# Patient Record
Sex: MALE
[De-identification: names, ages, dates, MRNs, and addresses within clinical notes are randomized per-mention and may not be internally consistent; named-entity substitution may affect disease eponyms.]

---

## 2019-11-06 ENCOUNTER — HEALTH MAINTENANCE LETTER (OUTPATIENT)
Age: 34
End: 2019-11-06

## 2020-11-29 ENCOUNTER — HEALTH MAINTENANCE LETTER (OUTPATIENT)
Age: 35
End: 2020-11-29

## 2021-09-19 ENCOUNTER — HEALTH MAINTENANCE LETTER (OUTPATIENT)
Age: 36
End: 2021-09-19

## 2022-01-09 ENCOUNTER — HEALTH MAINTENANCE LETTER (OUTPATIENT)
Age: 37
End: 2022-01-09

## 2022-11-21 ENCOUNTER — HEALTH MAINTENANCE LETTER (OUTPATIENT)
Age: 37
End: 2022-11-21

## 2023-04-16 ENCOUNTER — HEALTH MAINTENANCE LETTER (OUTPATIENT)
Age: 38
End: 2023-04-16

## 2023-06-12 ENCOUNTER — PRE VISIT (OUTPATIENT)
Dept: ONCOLOGY | Facility: CLINIC | Age: 38
End: 2023-06-12
Payer: COMMERCIAL

## 2023-06-12 ENCOUNTER — TRANSCRIBE ORDERS (OUTPATIENT)
Dept: OTHER | Age: 38
End: 2023-06-12

## 2023-06-12 ENCOUNTER — PATIENT OUTREACH (OUTPATIENT)
Dept: ONCOLOGY | Facility: CLINIC | Age: 38
End: 2023-06-12
Payer: COMMERCIAL

## 2023-06-12 DIAGNOSIS — Z15.01 BRCA2 POSITIVE: Primary | ICD-10-CM

## 2023-06-12 DIAGNOSIS — Z15.09 BRCA2 POSITIVE: Primary | ICD-10-CM

## 2023-06-12 NOTE — PROGRESS NOTES
Writer received referral to Cancer Risk Management/Genetic Counseling.    Referred for: Patient is previous patient of Jenna Evans, APRN-CNS, OCN, ANG-BC, for BRCA2 positive    Per past note 2015:  He requires screening and surveillance to minimize his risk of cancer secondary to having a deleterious BRCA2 mutation, specifically 6174delT.  He is considered to be at higher risk for male breast cancer, prostate cancer, and melanoma.    Referral reviewed for appropriate plan, and sent to New Patient Scheduling for completion.    Fartun Tavares, RN, BSN  Oncology New Patient Nurse Navigator   [facility]  [phone]

## 2023-06-12 NOTE — TELEPHONE ENCOUNTER
BRCA2 Positive    Action June 12, 2023 3:04 PM TJ   Incoming call/Self Referral Called transferred from New PT scheduling.  PT seen here prior to 2015 when he relocated.  Since then he has the following information:    -[facility] [phone]   -[facility] 2017- Present  -No imaging since leaving [facility] the first time  -Genetic testing originally done either here or [facility]

## 2023-06-27 NOTE — TELEPHONE ENCOUNTER
RECORDS STATUS - ALL OTHER DIAGNOSIS      RECORDS RECEIVED FROM: [facility]   DATE RECEIVED:    NOTES STATUS DETAILS   OFFICE NOTE from referring provider External 6/12/23: Self Referred   OFFICE NOTE from medical oncologist [facility] 10/1/15: Lucrecia Olopade  4/18/14: Jenna Evans, CNS   OFFICE NOTE from genetic counselor [facility] 2/15/18: Dr. Milton Betancourt  4/18/14: Roro Mike GC   OPERATIVE REPORT [facility] 5/9/22: Skin Biopsy   MEDICATION LIST [facility]    LABS     PATHOLOGY REPORTS [facility] 5/9/22: [id number]   ANYTHING RELATED TO DIAGNOSIS CE- [facility] Most recent from 6/1/23   GENONOMIC TESTING     TYPE: External: [facility] 1/15/15: [id number]

## 2023-06-28 ENCOUNTER — PRE VISIT (OUTPATIENT)
Dept: OTHER | Age: 38
End: 2023-06-28

## 2023-06-30 ENCOUNTER — ONCOLOGY VISIT (OUTPATIENT)
Dept: ONCOLOGY | Facility: CLINIC | Age: 38
End: 2023-06-30
Attending: CLINICAL NURSE SPECIALIST
Payer: COMMERCIAL

## 2023-06-30 VITALS
DIASTOLIC BLOOD PRESSURE: 66 MMHG | RESPIRATION RATE: 16 BRPM | BODY MASS INDEX: 23.97 KG/M2 | TEMPERATURE: 98.8 F | HEIGHT: 74 IN | OXYGEN SATURATION: 97 % | WEIGHT: 186.8 LBS | HEART RATE: 75 BPM | SYSTOLIC BLOOD PRESSURE: 107 MMHG

## 2023-06-30 DIAGNOSIS — Z12.5 SCREENING FOR PROSTATE CANCER: ICD-10-CM

## 2023-06-30 DIAGNOSIS — Z15.09 BRCA2 GENETIC CARRIER: Primary | ICD-10-CM

## 2023-06-30 DIAGNOSIS — Z15.01 BRCA2 GENETIC CARRIER: Primary | ICD-10-CM

## 2023-06-30 PROBLEM — L85.8 KP (KERATOSIS PILARIS): Status: ACTIVE | Noted: 2019-01-21

## 2023-06-30 PROBLEM — I73.00 RAYNAUD'S PHENOMENON WITHOUT GANGRENE: Status: ACTIVE | Noted: 2023-06-01

## 2023-06-30 PROBLEM — Z15.03 BRCA GENE MUTATION POSITIVE IN MALE: Status: ACTIVE | Noted: 2023-06-01

## 2023-06-30 PROBLEM — H52.222 REGULAR ASTIGMATISM OF LEFT EYE: Status: ACTIVE | Noted: 2017-08-17

## 2023-06-30 PROBLEM — K64.8 HEMORRHOIDS, INTERNAL: Status: ACTIVE | Noted: 2023-06-01

## 2023-06-30 LAB — PSA SERPL DL<=0.01 NG/ML-MCNC: 1.49 NG/ML (ref 0–2.5)

## 2023-06-30 PROCEDURE — 36415 COLL VENOUS BLD VENIPUNCTURE: CPT | Performed by: CLINICAL NURSE SPECIALIST

## 2023-06-30 PROCEDURE — 84153 ASSAY OF PSA TOTAL: CPT | Performed by: CLINICAL NURSE SPECIALIST

## 2023-06-30 PROCEDURE — 99204 OFFICE O/P NEW MOD 45 MIN: CPT | Performed by: CLINICAL NURSE SPECIALIST

## 2023-06-30 PROCEDURE — G0463 HOSPITAL OUTPT CLINIC VISIT: HCPCS | Performed by: CLINICAL NURSE SPECIALIST

## 2023-06-30 RX ORDER — HYDROCORTISONE 2.5 %
CREAM (GRAM) TOPICAL
COMMUNITY
Start: 2023-06-05

## 2023-06-30 RX ORDER — TACROLIMUS 1 MG/G
OINTMENT TOPICAL
COMMUNITY
Start: 2023-06-05

## 2023-06-30 RX ORDER — BETAMETHASONE VALERATE 1.2 MG/G
CREAM TOPICAL
COMMUNITY
Start: 2023-06-05

## 2023-06-30 ASSESSMENT — PAIN SCALES - GENERAL: PAINLEVEL: NO PAIN (0)

## 2023-06-30 NOTE — LETTER
2023         RE: Max Rose  [address]  [facility] [address]        Dear Colleague,    Thank you for referring your patient, Max Rose, to the [facility] [place] [facility]. Please see a copy of my visit note below.    Oncology Risk Management Consultation:  Date on this visit: 2023    Max Rose returns to the clinic today for an oncology risk management consultation. He requires specialized screening to reduce his risk of cancer secondary to a deleterious BRCA2 mutation and is considered to be at high risk for hereditary male breast and prostate cancer. 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    Primary Physician: Delio Wiseman MD    History Of Present Illness:  Mr. Rose is a very pleasant, healthy  38 year old male who presents with family history of breast and prostate cancer and a personal diagnosis of a BRCA2 mutation.    Genetic Testin2014- Positive for the 6174delT BRCA2 gene, identified using a Multisite 3 BRACAnalysis through [employer] at [facility].      Screening History:    Pancreas:  6/10/2015- CT of abdomen and pelvis (Dr. Mark Diane): IMPRESSION:   1. No evidence of pancreatic cancer.  2. 7 cm simple cyst in the upper pole of the left kidney.  3. Calcifications in the prostate.     Prostate:  2016- 0.6, WNL    Skin:  2014- Dermatology screening: FINAL DIAGNOSIS:  Skin, back, right upper: - Nevus pigmentosum, compound, with inflammation  Reports annual dermatology screenings.    Review of Systems:  GENERAL: No change in weight, sleep or appetite.  Normal energy.  No fever or chills  EYES: Negative for vision changes or eye problems  ENT: No problems with ears, nose or throat.  No difficulty swallowing.  RESP: No coughing, wheezing or shortness of breath  CV: No chest pains or palpitations  GI: No nausea, vomiting,  heartburn, abdominal pain, diarrhea, constipation  or change in bowel habits  : Notes that it is taking him a bit longer to start his urine stream than it used to. No urinary frequency or dysuria, bladder or kidney problems  MUSCULOSKELETAL: No significant muscle or joint pains  NEUROLOGIC: No headaches, numbness, tingling, weakness, problems with balance or coordination  PSYCHIATRIC: No problems with anxiety, depression or mental health  HEME/IMMUNE/ALLERGY: No history of bleeding or clotting problems or anemia.  No allergies or immune system problems  ENDOCRINE: No history of thyroid disease, diabetes or other endocrine disorders  SKIN: No rashes,worrisome lesions or skin problems    Past Medical History:   Diagnosis Date     BRCA2 positive 2014    6174delTmutation     No past surgical history on file.    Allergies:  Allergies as of 2023     (No Known Allergies)       Current Medications:  Current Outpatient Medications   Medication Sig Dispense Refill     betamethasone valerate (VALISONE) 0.1 % external cream        hydrocortisone 2.5 % cream        tacrolimus (PROTOPIC) 0.1 % external ointment           Family History:  Family History   Problem Relation Age of Onset     Hypothyroidism Mother      Hypertension Mother      Hereditary Breast and Ovarian Cancer Syndrome Father         obligate carrier     Lymphoma Father          at 69     Parkinsonism Father      Hereditary Breast and Ovarian Cancer Syndrome Sister      Restless Leg Syndrome Brother      Sleep Apnea Brother      Esophageal Cancer Maternal Grandmother 78        hx of smoking     Bladder Cancer Maternal Grandfather         hx of smoking;  at 88     Myocardial Infarction Maternal Grandfather      Breast Cancer Paternal Grandmother      Pancreatic Cancer Paternal Grandmother          at 64     Emphysema Paternal Grandfather      Myocardial Infarction Paternal Grandfather      Breast Cancer Paternal Aunt 50        bilateral breast cancer     Hereditary Breast and Ovarian Cancer  "Syndrome Paternal Aunt         BRCA2+     Colon Cancer Maternal Great-Grandmother         over the age of 50     Colon Cancer Other 40        paternal great uncle;  in 40       Social History:  Social History     Socioeconomic History     Marital status: Single     Spouse name: Not on file     Number of children: Not on file     Years of education: Not on file     Highest education level: Not on file   Occupational History     Occupation: Physician     Employer: UNKNOWN   Tobacco Use     Smoking status: Never     Smokeless tobacco: Never   Substance and Sexual Activity     Alcohol use: Not Currently     Drug use: Not on file     Sexual activity: Not on file   Other Topics Concern     Parent/sibling w/ CABG, MI or angioplasty before 65F 55M? Not Asked   Social History Narrative     Not on file     Social Determinants of Health     Financial Resource Strain: Not on file   Food Insecurity: Not on file   Transportation Needs: Not on file   Physical Activity: Not on file   Stress: Not on file   Social Connections: Not on file   Intimate Partner Violence: Not on file   Housing Stability: Not on file       Physical Exam:  /66   Pulse 75   Temp 98.8  F (37.1  C) (Oral)   Resp 16   Ht 1.891 m (6' 2.45\")   Wt 84.7 kg (186 lb 12.8 oz)   SpO2 97%   BMI 23.70 kg/m    GENERAL: Healthy, alert, oriented     NECK: no adenopathy, no asymmetry or masses     LYMPHATICS: No cervical, supraclavicular, axillary or inguinal lymphadenopathy     RESP: lungs clear to auscultation - no rales, rhonchi or wheezes     BREAST TISSUE:: A multipositional, bilateral breast exam was performed. Symmetrical nipples, pectoralis muscles.  Right chest: no palpable dominant masses, no nipple discharge, no skin changes.  Right axilla: no palpable adenopathy. Left chest: no palpable dominant masses, no nipple discharge, no skin changes. Left axilla: no palpable adenopathy.  CARDIOVASCULAR: regular rate and rhythm, normal S1 S2, no S3 or S4 " and no murmur.        SKIN: multiple small raised red and pink bumps on feet and ankles bilaterally. Skin torn on right ankle, but no suspicious lesions or rashes    Laboratory/Imaging Studies  No results found for any visits on 06/30/23.    ASSESSMENT  Max is doing well, happy to be back in [place] and enjoying being around his nieces and nephews here.  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 He has no immediate concerns today and is here to be updated on screening recommendations. We also reviewed the [employer]'s web site with regard to clinical trials in which he could enroll. (See: [employer]).    We discussed the differences between cancer risk for the general population and those with BRCA mutations and reviewed and updated his family history.   Current literature shows that men with BRCA 2 mutations have a 6%  lifetime risk of breast cancer, compared to the general population risk of 1%. Those with a BRCA2 mutation also bear an approximate 35%  lifetime risk of prostate cancer, compared to the 15% lifetime risk within the general population (Charlene et al 2003, Verna et al 201, Tyron et al 2007, Miguelito et al 2010 and NCCN Clinical Practice Guidelines 2015). There is also an increased risk of pancreatic cancer and melanoma. It is expected that 1 percent of all breast cancers in the [place] occur in men, with an estimated 2,240 new cases expected per year.    We discussed signs and symptoms to be watchful for (lumps, hard know or thickening in the breast (usually painless but may be tender), dimpling, puckering or redness of the skin of the breast, itchy, scaly sore or rash on the nipple, nipple discharge and pulling in of the nipple or other parts of the breast.) Today, we francis his blood for his PSA test..     Individualized Surveillance Plan  BRCA1 and BRCA2 Management for Men   Per NCCN Guidelines Version 1.2023   Test or procedure  Last done Next Scheduled      Male breast  cancer, starting at age 35    Risk is 0.2%-1.2% for BRCA1+  Risk is 1.8%-7.1% for BRCA2+ Breast self exam training and education   Complete     Continual     Male breast cancer, starting at age 35 Clinical breast exam, annually   6/30/2023 June 2024     Consider in men with gynomastia starting at 50 or 10 years prior to the earliest male breast cancer in the family Consider annual mammogram     NA   No male breast cancer in family   Prostate cancer screening recommended for BRCA2 carriers starting at age 40.    Risk is 19-61% for BRCA2+    Consider prostate cancer screening for BRCA1 carriers, starting at age 40.    Risk is 7-26% for BRCA1+   Annual PSA test   6/30/2023 June 2024   Pancreatic screening starting at age 50 or 10 years prior to the youngest age of pancreatic cancer in the family, for patients with at least one relative from the BRCA-side of the family with pancreatic cancer   Alternate Magnetic Resonance Cholangiopancreatography and Endoscopic Ultrasound every 1-2 years.  Baseline CT completed; consider routine MRCP screening at 50       Consider full body skin and eye exam for melanoma screening.     I spent a total of 56 minutes on the day of the visit. Please see the note for further information on patient assessment and treatment.    ISABELL Reyes-CNS, OCN, AGN-BC  Clinical Nurse Specialist  Cancer Risk Management Program  [facility]; Delio Wiseman MD

## 2023-06-30 NOTE — PATIENT INSTRUCTIONS
Individualized Surveillance Plan  BRCA1 and BRCA2 Management for Men   Per NCCN Guidelines Version 1.2023   Test or procedure  Last done Next Scheduled      Male breast cancer, starting at age 35    Risk is 0.2%-1.2% for BRCA1+  Risk is 1.8%-7.1% for BRCA2+ Breast self exam training and education   Complete     Continual     Male breast cancer, starting at age 35 Clinical breast exam, annually   6/30/2023 June 2024     Consider in men with gynomastia starting at 50 or 10 years prior to the earliest male breast cancer in the family Consider annual mammogram     NA   No male breast cancer in family   Prostate cancer screening recommended for BRCA2 carriers starting at age 40.    Risk is 19-61% for BRCA2+    Consider prostate cancer screening for BRCA1 carriers, starting at age 40.    Risk is 7-26% for BRCA1+   Annual PSA test     6/30/2023 June 2024   Pancreatic screening starting at age 50 or 10 years prior to the youngest age of pancreatic cancer in the family, for patients with at least one relative from the BRCA-side of the family with pancreatic cancer   Alternate Magnetic Resonance Cholangiopancreatography and Endoscopic Ultrasound every 1-2 years.  Baseline CT completed; consider routine MRCP screening at 50       Consider full body skin and eye exam for melanoma screening.

## 2023-06-30 NOTE — NURSING NOTE
"Oncology Rooming Note    June 30, 2023 9:55 AM   Max Rose is a 38 year old male who presents for:    Chief Complaint   Patient presents with     Oncology Clinic Visit     BRCA2 genetic carrier      Initial Vitals: /66   Pulse 75   Temp 98.8  F (37.1  C) (Oral)   Resp 16   Ht 1.891 m (6' 2.45\")   Wt 84.7 kg (186 lb 12.8 oz)   SpO2 97%   BMI 23.70 kg/m   Estimated body mass index is 23.7 kg/m  as calculated from the following:    Height as of this encounter: 1.891 m (6' 2.45\").    Weight as of this encounter: 84.7 kg (186 lb 12.8 oz). Body surface area is 2.11 meters squared.  No Pain (0) Comment: Data Unavailable   No LMP for male patient.  Allergies reviewed: Yes  Medications reviewed: Yes    Medications: Medication refills not needed today.  Pharmacy name entered into [employer]: [id number] [place], [place] - [facility] [address]    Clinical concerns: No new clinical concerns other than reason for visit today.    Katherine Simon, EMT    "

## 2023-06-30 NOTE — NURSING NOTE
After I verified name and date of birth. I drew labs via venipuncture on this pt while in clinic. They tolerated this well.   Labs sent down to the first floor labs.    Stephany Crenshaw, A

## 2023-06-30 NOTE — PROGRESS NOTES
Oncology Risk Management Consultation:  Date on this visit: 2023    Max Rose returns to the clinic today for an oncology risk management consultation. He requires specialized screening to reduce his risk of cancer secondary to a deleterious BRCA2 mutation and is considered to be at high risk for hereditary male breast and prostate cancer. 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    Primary Physician: Delio Wiseman MD    History Of Present Illness:  Mr. Rose is a very pleasant, healthy  38 year old male who presents with family history of breast and prostate cancer and a personal diagnosis of a BRCA2 mutation.    Genetic Testin2014- Positive for the 6174delT BRCA2 gene, identified using a Multisite 3 BRACAnalysis through [email or web address] at [facility].      Screening History:    Pancreas:  6/10/2015- CT of abdomen and pelvis (Dr. Mark Diane): IMPRESSION:   1. No evidence of pancreatic cancer.  2. 7 cm simple cyst in the upper pole of the left kidney.  3. Calcifications in the prostate.     Prostate:  2016- 0.6, WNL    Skin:  2014- Dermatology screening: FINAL DIAGNOSIS:  Skin, back, right upper: - Nevus pigmentosum, compound, with inflammation  Reports annual dermatology screenings.    Review of Systems:  GENERAL: No change in weight, sleep or appetite.  Normal energy.  No fever or chills  EYES: Negative for vision changes or eye problems  ENT: No problems with ears, nose or throat.  No difficulty swallowing.  RESP: No coughing, wheezing or shortness of breath  CV: No chest pains or palpitations  GI: No nausea, vomiting,  heartburn, abdominal pain, diarrhea, constipation or change in bowel habits  : Notes that it is taking him a bit longer to start his urine stream than it used to. No urinary frequency or dysuria, bladder or kidney problems  MUSCULOSKELETAL: No significant muscle or joint pains  NEUROLOGIC: No headaches,  numbness, tingling, weakness, problems with balance or coordination  PSYCHIATRIC: No problems with anxiety, depression or mental health  HEME/IMMUNE/ALLERGY: No history of bleeding or clotting problems or anemia.  No allergies or immune system problems  ENDOCRINE: No history of thyroid disease, diabetes or other endocrine disorders  SKIN: No rashes,worrisome lesions or skin problems    Past Medical History:   Diagnosis Date     BRCA2 positive 2014    6174delTmutation     No past surgical history on file.    Allergies:  Allergies as of 2023     (No Known Allergies)       Current Medications:  Current Outpatient Medications   Medication Sig Dispense Refill     betamethasone valerate (VALISONE) 0.1 % external cream        hydrocortisone 2.5 % cream        tacrolimus (PROTOPIC) 0.1 % external ointment           Family History:  Family History   Problem Relation Age of Onset     Hypothyroidism Mother      Hypertension Mother      Hereditary Breast and Ovarian Cancer Syndrome Father         obligate carrier     Lymphoma Father          at 69     Parkinsonism Father      Hereditary Breast and Ovarian Cancer Syndrome Sister      Restless Leg Syndrome Brother      Sleep Apnea Brother      Esophageal Cancer Maternal Grandmother 78        hx of smoking     Bladder Cancer Maternal Grandfather         hx of smoking;  at 88     Myocardial Infarction Maternal Grandfather      Breast Cancer Paternal Grandmother      Pancreatic Cancer Paternal Grandmother          at 64     Emphysema Paternal Grandfather      Myocardial Infarction Paternal Grandfather      Breast Cancer Paternal Aunt 50        bilateral breast cancer     Hereditary Breast and Ovarian Cancer Syndrome Paternal Aunt         BRCA2+     Colon Cancer Maternal Great-Grandmother         over the age of 50     Colon Cancer Other 40        paternal great uncle;  in 40       Social History:  Social History     Socioeconomic History     Marital  "status: Single     Spouse name: Not on file     Number of children: Not on file     Years of education: Not on file     Highest education level: Not on file   Occupational History     Occupation: Physician     Employer: UNKNOWN   Tobacco Use     Smoking status: Never     Smokeless tobacco: Never   Substance and Sexual Activity     Alcohol use: Not Currently     Drug use: Not on file     Sexual activity: Not on file   Other Topics Concern     Parent/sibling w/ CABG, MI or angioplasty before 65F 55M? Not Asked   Social History Narrative     Not on file     Social Determinants of Health     Financial Resource Strain: Not on file   Food Insecurity: Not on file   Transportation Needs: Not on file   Physical Activity: Not on file   Stress: Not on file   Social Connections: Not on file   Intimate Partner Violence: Not on file   Housing Stability: Not on file       Physical Exam:  /66   Pulse 75   Temp 98.8  F (37.1  C) (Oral)   Resp 16   Ht 1.891 m (6' 2.45\")   Wt 84.7 kg (186 lb 12.8 oz)   SpO2 97%   BMI 23.70 kg/m    GENERAL: Healthy, alert, oriented     NECK: no adenopathy, no asymmetry or masses     LYMPHATICS: No cervical, supraclavicular, axillary or inguinal lymphadenopathy     RESP: lungs clear to auscultation - no rales, rhonchi or wheezes     BREAST TISSUE:: A multipositional, bilateral breast exam was performed. Symmetrical nipples, pectoralis muscles.  Right chest: no palpable dominant masses, no nipple discharge, no skin changes.  Right axilla: no palpable adenopathy. Left chest: no palpable dominant masses, no nipple discharge, no skin changes. Left axilla: no palpable adenopathy.  CARDIOVASCULAR: regular rate and rhythm, normal S1 S2, no S3 or S4 and no murmur.        SKIN: multiple small raised red and pink bumps on feet and ankles bilaterally. Skin torn on right ankle, but no suspicious lesions or rashes    Laboratory/Imaging Studies  No results found for any visits on " 06/30/23.    ASSESSMENT  Max is doing well, happy to be back in [place] and enjoying being around his nieces and nephews here.  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 He has no immediate concerns today and is here to be updated on screening recommendations. We also reviewed the [employer]'s web site with regard to clinical trials in which he could enroll. (See: [employer]).    We discussed the differences between cancer risk for the general population and those with BRCA mutations and reviewed and updated his family history.   Current literature shows that men with BRCA 2 mutations have a 6%  lifetime risk of breast cancer, compared to the general population risk of 1%. Those with a BRCA2 mutation also bear an approximate 35%  lifetime risk of prostate cancer, compared to the 15% lifetime risk within the general population (Charlene et al 2003, Verna et al 201, Tyron et al 2007, Miguelito et al 2010 and NCCN Clinical Practice Guidelines 2015). There is also an increased risk of pancreatic cancer and melanoma. It is expected that 1 percent of all breast cancers in the [place] occur in men, with an estimated 2,240 new cases expected per year.    We discussed signs and symptoms to be watchful for (lumps, hard know or thickening in the breast (usually painless but may be tender), dimpling, puckering or redness of the skin of the breast, itchy, scaly sore or rash on the nipple, nipple discharge and pulling in of the nipple or other parts of the breast.) Today, we francis his blood for his PSA test..     Individualized Surveillance Plan  BRCA1 and BRCA2 Management for Men   Per NCCN Guidelines Version 1.2023   Test or procedure  Last done Next Scheduled      Male breast cancer, starting at age 35    Risk is 0.2%-1.2% for BRCA1+  Risk is 1.8%-7.1% for BRCA2+ Breast self exam training and education   Complete     Continual     Male breast cancer, starting at age 35 Clinical breast exam, annually    6/30/2023 June 2024     Consider in men with gynomastia starting at 50 or 10 years prior to the earliest male breast cancer in the family Consider annual mammogram     NA   No male breast cancer in family   Prostate cancer screening recommended for BRCA2 carriers starting at age 40.    Risk is 19-61% for BRCA2+    Consider prostate cancer screening for BRCA1 carriers, starting at age 40.    Risk is 7-26% for BRCA1+   Annual PSA test   6/30/2023 June 2024   Pancreatic screening starting at age 50 or 10 years prior to the youngest age of pancreatic cancer in the family, for patients with at least one relative from the BRCA-side of the family with pancreatic cancer   Alternate Magnetic Resonance Cholangiopancreatography and Endoscopic Ultrasound every 1-2 years.  Baseline CT completed; consider routine MRCP screening at 50       Consider full body skin and eye exam for melanoma screening.     I spent a total of 56 minutes on the day of the visit. Please see the note for further information on patient assessment and treatment.    ISABELL Reyes-CNS, OCN, AGN-BC  Clinical Nurse Specialist  Cancer Risk Management Program  [facility]; Delio Wiseman MD

## 2024-06-10 NOTE — PROGRESS NOTES
Oncology Risk Management Consultation:  Date on this visit: 2024    Max Rose returns to the clinic today for an oncology risk management consultation. He requires specialized screening to reduce his risk of cancer secondary to a deleterious BRCA2 mutation and is considered to be at high risk for hereditary male breast and prostate cancer.      Primary Physician: Delio Wiseman MD     History Of Present Illness:  Mr. Rose is a very pleasant, healthy 39 year old male who presents with family history of breast and prostate cancer and a personal diagnosis of a BRCA2 mutation.     Genetic Testin2014- Positive for the 6174delT BRCA2 gene, [place] using a Multisite 3 BRACAnalysis [place] [employer] [place] [facility] [place]       [place] History:     Pancreas:  6/10/2015- CT of abdomen and pelvis (Dr. Mark Diane): IMPRESSION:   1. No evidence of pancreatic cancer.  2. 7 cm simple cyst in the upper pole of the left kidney.  3. Calcifications in the prostate.      Prostate:  2016- 0.6, WNL  2023: 1.49  2024: 0.59     Skin:  2014- Dermatology screening: FINAL DIAGNOSIS:  Skin, back, right upper: - Nevus pigmentosum, compound, with inflammation    Reports annual dermatology and optometry screenings.      Past Medical/Surgical History:  Past Medical History:   Diagnosis Date    BRCA2 positive 2014    6174delTmutation     History reviewed. No pertinent surgical history.    Allergies:  Allergies as of 2024 - Reviewed 2024   Allergen Reaction Noted    Trazodone Other (See Comments) 2023       Current Medications:  Current Outpatient Medications   Medication Sig Dispense Refill    betamethasone valerate (VALISONE) 0.1 % external cream       dextroamphetamine (DEXEDRINE SPANSULE) 10 MG 24 hr capsule Take 20 mg by mouth      fluticasone (FLONASE) 50 MCG/ACT nasal spray 2 sprays      hydrocortisone 2.5 % cream       QUEtiapine (SEROQUEL) 50 MG tablet Take   mg by mouth      tacrolimus (PROTOPIC) 0.1 % external ointment           Family History:  Family History   Problem Relation Age of Onset    Hypothyroidism Mother     Hypertension Mother     Hereditary Breast and Ovarian Cancer Syndrome Father         obligate carrier    Lymphoma Father          at 69    Parkinsonism Father     Hereditary Breast and Ovarian Cancer Syndrome Sister     Restless Leg Syndrome Brother     Sleep Apnea Brother     Esophageal Cancer Maternal Grandmother 78        hx of smoking    Bladder Cancer Maternal Grandfather         hx of smoking;  at 88    Myocardial Infarction Maternal Grandfather     Breast Cancer Paternal Grandmother     Pancreatic Cancer Paternal Grandmother          at 64    Emphysema Paternal Grandfather     Myocardial Infarction Paternal Grandfather     Breast Cancer Paternal Aunt 50        bilateral breast cancer    Hereditary Breast and Ovarian Cancer Syndrome Paternal Aunt         BRCA2+    Colon Cancer Maternal Great-Grandmother         over the age of 50    Colon Cancer Other 40        paternal great uncle;  in 40       Social History:  Social History     Socioeconomic History    Marital status: Single     Spouse name: Not on file    Number of children: Not on file    Years of education: Not on file    Highest education level: Not on file   Occupational History    Occupation: Physician     Employer: UNKNOWN   Tobacco Use    Smoking status: Never    Smokeless tobacco: Never   Substance and Sexual Activity    Alcohol use: Not Currently    Drug use: Not on file    Sexual activity: Not on file   Other Topics Concern    Parent/sibling w/ CABG, MI or angioplasty before 65F 55M? Not Asked   Social History Narrative    Not on file     Social Determinants of Health     Financial Resource Strain: Not on file   Food Insecurity: Not on file   Transportation Needs: Not on file   Physical Activity: Not on file   Stress: Not on file   Social Connections: Not on  file   Interpersonal Safety: Not on file   Housing Stability: Not on file       Physical Exam:  /69   Pulse 69   Temp 97.8  F (36.6  C)   Resp 16   Wt 88.5 kg (195 lb)   SpO2 100%   BMI 24.74 kg/m    GENERAL APPEARANCE: healthy, alert and no apparent distress, except for the concern of her risk of cancer   LYMPHATICS: No cervical, supraclavicular, or axillary lymphadenopathy  CHEST WALL: Examined in a sitting and lying position. Symmetrical without visible distortion, swelling, lumps or rashes. Axillary area without masses or lymphadenopathy.   SKIN: no suspicious lesions or rashes on examined skin    Laboratory/Imaging Studies  Results for orders placed or performed in visit on 06/12/24   PSA tumor marker     Status: Normal   Result Value Ref Range    PSA Tumor Marker 0.59 0.00 - 2.50 ng/mL    Narrative    This result is obtained using the Roche Elecsys total PSA method on the sarah e411 immunoassay analyzer. Results obtained with different assay methods or kits cannot be used interchangeably.       ASSESSMENT    Cesar reports that he is doing well at this visit. He did recently breast his right wrist playing [employer], which is healing well. He has no concerns with his health today, and no updates to his family's medical history. He reports that he recently participated in the [employer] trial through [employer], and, again, tested positive for the same, known, BRCA2 mutation.     We discussed signs and symptoms to be watchful for between visits including breast lumps, thickening, swelling, tenderness, nipple discharge or changes in skin of breasts. We will proceed with the plan below. I will see him back in one year.       INDIVIDUALIZED CANCER RISK MANAGEMENT PLAN:    Individualized Surveillance Plan  BRCA1 and BRCA2 Management for Men   Per NCCN Guidelines Version 1.2024   Test or procedure  Last done Next Scheduled      Male breast cancer, starting at age 35    Risk is 0.2%-1.2% for BRCA1+  Risk  is 1.8%-7.1% for BRCA2+ Breast self exam training and education   June 2024 June 2025     Male breast cancer, starting at age 35 Clinical breast exam, annually   June 2024 June 2025     Consider in men with gynemastia starting at 50 or 10 years prior to the earliest male breast cancer in the family Consider annual mammogram     NA, no male breast cancer in the family   Review at future visits   Prostate cancer screening recommended for BRCA2 carriers starting at age 40.    Risk is 19-61% for BRCA2+    Consider prostate cancer screening for BRCA1 carriers, starting at age 40.    Risk is 7-26% for BRCA1+   Annual PSA test   6/30/2023: 1.49    Scheduled for later today    Repeat next June   Pancreatic screening starting at age 50 or 10 years prior to the youngest age of pancreatic cancer in the family, for patients with at least one relative from the BRCA-side of the family with pancreatic cancer   Alternate Magnetic Resonance Cholangiopancreatography and Endoscopic Ultrasound every 1-2 years.   Paternal grandmother with pancreatic cancer   Consider screening at age 50     Consider full body skin and eye exam for melanoma screening.         I spent a total of 32 minutes on the day of the visit. Please see the note for further information on patient assessment and treatment.     Tracy Garnica, TERESA, APRN, AGCNS-BC  Clinical Nurse Specialist  Cancer Risk Management Program  [facility]    Cc:  Delio Wiseman MD

## 2024-06-12 ENCOUNTER — LAB (OUTPATIENT)
Dept: LAB | Facility: CLINIC | Age: 39
End: 2024-06-12
Payer: COMMERCIAL

## 2024-06-12 ENCOUNTER — ONCOLOGY VISIT (OUTPATIENT)
Dept: ONCOLOGY | Facility: CLINIC | Age: 39
End: 2024-06-12
Attending: CLINICAL NURSE SPECIALIST
Payer: COMMERCIAL

## 2024-06-12 VITALS
TEMPERATURE: 97.8 F | RESPIRATION RATE: 16 BRPM | OXYGEN SATURATION: 100 % | WEIGHT: 195 LBS | BODY MASS INDEX: 24.74 KG/M2 | DIASTOLIC BLOOD PRESSURE: 69 MMHG | SYSTOLIC BLOOD PRESSURE: 112 MMHG | HEART RATE: 69 BPM

## 2024-06-12 DIAGNOSIS — Z15.09 BRCA2 GENETIC CARRIER: ICD-10-CM

## 2024-06-12 DIAGNOSIS — Z15.01 BRCA2 GENETIC CARRIER: ICD-10-CM

## 2024-06-12 DIAGNOSIS — Z12.5 SCREENING FOR PROSTATE CANCER: ICD-10-CM

## 2024-06-12 DIAGNOSIS — Z15.01 BRCA2 GENETIC CARRIER: Primary | ICD-10-CM

## 2024-06-12 DIAGNOSIS — Z15.09 BRCA2 GENETIC CARRIER: Primary | ICD-10-CM

## 2024-06-12 LAB — PSA SERPL DL<=0.01 NG/ML-MCNC: 0.59 NG/ML (ref 0–2.5)

## 2024-06-12 PROCEDURE — 84153 ASSAY OF PSA TOTAL: CPT

## 2024-06-12 PROCEDURE — 99213 OFFICE O/P EST LOW 20 MIN: CPT

## 2024-06-12 PROCEDURE — 36415 COLL VENOUS BLD VENIPUNCTURE: CPT

## 2024-06-12 PROCEDURE — 99212 OFFICE O/P EST SF 10 MIN: CPT

## 2024-06-12 RX ORDER — DEXTROAMPHETAMINE SULFATE 10 MG/1
20 CAPSULE, EXTENDED RELEASE ORAL
COMMUNITY
Start: 2024-06-07

## 2024-06-12 RX ORDER — QUETIAPINE FUMARATE 50 MG/1
50-200 TABLET, FILM COATED ORAL
COMMUNITY
Start: 2024-03-15

## 2024-06-12 RX ORDER — FLUTICASONE PROPIONATE 50 MCG
2 SPRAY, SUSPENSION (ML) NASAL
COMMUNITY
Start: 2024-05-17

## 2024-06-12 ASSESSMENT — PAIN SCALES - GENERAL: PAINLEVEL: NO PAIN (0)

## 2024-06-12 NOTE — PATIENT INSTRUCTIONS
Individualized Surveillance Plan  BRCA1 and BRCA2 Management for Men   Per NCCN Guidelines Version 1.2024   Test or procedure  Last done Next Scheduled      Male breast cancer, starting at age 35    Risk is 0.2%-1.2% for BRCA1+  Risk is 1.8%-7.1% for BRCA2+ Breast self exam training and education   June 2024 June 2025     Male breast cancer, starting at age 35 Clinical breast exam, annually   June 2024 June 2025     Consider in men with gynemastia starting at 50 or 10 years prior to the earliest male breast cancer in the family Consider annual mammogram     NA, no male breast cancer in the family   Review at future visits   Prostate cancer screening recommended for BRCA2 carriers starting at age 40.    Risk is 19-61% for BRCA2+    Consider prostate cancer screening for BRCA1 carriers, starting at age 40.    Risk is 7-26% for BRCA1+   Annual PSA test   6/30/2023: 1.49    Scheduled for later today    Repeat next June   Pancreatic screening starting at age 50 or 10 years prior to the youngest age of pancreatic cancer in the family, for patients with at least one relative from the BRCA-side of the family with pancreatic cancer   Alternate Magnetic Resonance Cholangiopancreatography and Endoscopic Ultrasound every 1-2 years.   Paternal grandmother with pancreatic cancer   Consider screening at age 50       Consider full body skin and eye exam for melanoma screening.

## 2024-06-12 NOTE — LETTER
2024      Max Rose  [address]  [facility] [address]      Dear Colleague,    Thank you for referring your patient, Max Rose, to the [facility]. Please see a copy of my visit note below.    Oncology Risk Management Consultation:  Date on this visit: 2024    Max Rose returns to the clinic today for an oncology risk management consultation. He requires specialized screening to reduce his risk of cancer secondary to a deleterious BRCA2 mutation and is considered to be at high risk for hereditary male breast and prostate cancer.      Primary Physician: Delio Wiseman MD     History Of Present Illness:  Mr. Rose is a very pleasant, healthy 39 year old male who presents with family history of breast and prostate cancer and a personal diagnosis of a BRCA2 mutation.     Genetic Testin2014- Positive for the 6174delT BRCA2 gene, identified using a Multisite 3 BRACAnalysis through [employer] at [facility] [place].       Screening History:     Pancreas:  6/10/2015- CT of abdomen and pelvis (Dr. Mark Diane): IMPRESSION:   1. No evidence of pancreatic cancer.  2. 7 cm simple cyst in the upper pole of the left kidney.  3. Calcifications in the prostate.      Prostate:  2016- 0.6, WNL  2023: 1.49  2024: 0.59     Skin:  2014- Dermatology screening: FINAL DIAGNOSIS:  Skin, back, right upper: - Nevus pigmentosum, compound, with inflammation    Reports annual dermatology and optometry screenings.      Past Medical/Surgical History:  Past Medical History:   Diagnosis Date     BRCA2 positive 2014    6174delTmutation     History reviewed. No pertinent surgical history.    Allergies:  Allergies as of 2024 - Reviewed 2024   Allergen Reaction Noted     Trazodone Other (See Comments) 2023       Current Medications:  Current Outpatient Medications   Medication Sig Dispense Refill     betamethasone valerate (VALISONE)  0.1 % external cream        dextroamphetamine (DEXEDRINE SPANSULE) 10 MG 24 hr capsule Take 20 mg by mouth       fluticasone (FLONASE) 50 MCG/ACT nasal spray 2 sprays       hydrocortisone 2.5 % cream        QUEtiapine (SEROQUEL) 50 MG tablet Take  mg by mouth       tacrolimus (PROTOPIC) 0.1 % external ointment           Family History:  Family History   Problem Relation Age of Onset     Hypothyroidism Mother      Hypertension Mother      Hereditary Breast and Ovarian Cancer Syndrome Father         obligate carrier     Lymphoma Father          at 69     Parkinsonism Father      Hereditary Breast and Ovarian Cancer Syndrome Sister      Restless Leg Syndrome Brother      Sleep Apnea Brother      Esophageal Cancer Maternal Grandmother 78        hx of smoking     Bladder Cancer Maternal Grandfather         hx of smoking;  at 88     Myocardial Infarction Maternal Grandfather      Breast Cancer Paternal Grandmother      Pancreatic Cancer Paternal Grandmother          at 64     Emphysema Paternal Grandfather      Myocardial Infarction Paternal Grandfather      Breast Cancer Paternal Aunt 50        bilateral breast cancer     Hereditary Breast and Ovarian Cancer Syndrome Paternal Aunt         BRCA2+     Colon Cancer Maternal Great-Grandmother         over the age of 50     Colon Cancer Other 40        paternal great uncle;  in 40       Social History:  Social History     Socioeconomic History     Marital status: Single     Spouse name: Not on file     Number of children: Not on file     Years of education: Not on file     Highest education level: Not on file   Occupational History     Occupation: Physician     Employer: UNKNOWN   Tobacco Use     Smoking status: Never     Smokeless tobacco: Never   Substance and Sexual Activity     Alcohol use: Not Currently     Drug use: Not on file     Sexual activity: Not on file   Other Topics Concern     Parent/sibling w/ CABG, MI or angioplasty before 65F 55M? Not  Asked   Social History Narrative     Not on file     Social Determinants of Health     Financial Resource Strain: Not on file   Food Insecurity: Not on file   Transportation Needs: Not on file   Physical Activity: Not on file   Stress: Not on file   Social Connections: Not on file   Interpersonal Safety: Not on file   Housing Stability: Not on file       Physical Exam:  /69   Pulse 69   Temp 97.8  F (36.6  C)   Resp 16   Wt 88.5 kg (195 lb)   SpO2 100%   BMI 24.74 kg/m    GENERAL APPEARANCE: healthy, alert and no apparent distress, except for the concern of her risk of cancer   LYMPHATICS: No cervical, supraclavicular, or axillary lymphadenopathy  CHEST WALL: Examined in a sitting and lying position. Symmetrical without visible distortion, swelling, lumps or rashes. Axillary area without masses or lymphadenopathy.   SKIN: no suspicious lesions or rashes on examined skin    Laboratory/Imaging Studies  Results for orders placed or performed in visit on 06/12/24   PSA tumor marker     Status: Normal   Result Value Ref Range    PSA Tumor Marker 0.59 0.00 - 2.50 ng/mL    Narrative    This result is obtained using the Roche Elecsys total PSA method on the sarah e411 immunoassay analyzer. Results obtained with different assay methods or kits cannot be used interchangeably.       ASSESSMENT    Cesar reports that he is doing well at this visit. He did recently breast his right wrist playing [employer], which is healing well. He has no concerns with his health today, and no updates to his family's medical history. He reports that he recently participated in the [employer] trial through [employer], and, again, tested positive for the same, known, BRCA2 mutation.     We discussed signs and symptoms to be watchful for between visits including breast lumps, thickening, swelling, tenderness, nipple discharge or changes in skin of breasts. We will proceed with the plan below. I will see him back in one year.        INDIVIDUALIZED CANCER RISK MANAGEMENT PLAN:    Individualized Surveillance Plan  BRCA1 and BRCA2 Management for Men   Per NCCN Guidelines Version 1.2024   Test or procedure  Last done Next Scheduled      Male breast cancer, starting at age 35    Risk is 0.2%-1.2% for BRCA1+  Risk is 1.8%-7.1% for BRCA2+ Breast self exam training and education   June 2024 June 2025     Male breast cancer, starting at age 35 Clinical breast exam, annually   June 2024 June 2025     Consider in men with gynemastia starting at 50 or 10 years prior to the earliest male breast cancer in the family Consider annual mammogram     NA, no male breast cancer in the family   Review at future visits   Prostate cancer screening recommended for BRCA2 carriers starting at age 40.    Risk is 19-61% for BRCA2+    Consider prostate cancer screening for BRCA1 carriers, starting at age 40.    Risk is 7-26% for BRCA1+   Annual PSA test   6/30/2023: 1.49    Scheduled for later today    Repeat next June   Pancreatic screening starting at age 50 or 10 years prior to the youngest age of pancreatic cancer in the family, for patients with at least one relative from the BRCA-side of the family with pancreatic cancer   Alternate Magnetic Resonance Cholangiopancreatography and Endoscopic Ultrasound every 1-2 years.   Paternal grandmother with pancreatic cancer   Consider screening at age 50     Consider full body skin and eye exam for melanoma screening.         I spent a total of 32 minutes on the day of the visit. Please see the note for further information on patient assessment and treatment.     Tracy Garnica, DNP, APRN, AGCNS-BC  Clinical Nurse Specialist  Cancer Risk Management Program  [facility]    Cc:  Delio Wiseman MD

## 2024-06-12 NOTE — NURSING NOTE
"Oncology Rooming Note    June 12, 2024 4:07 PM   Max Rose is a 39 year old male who presents for:    Chief Complaint   Patient presents with    Oncology Clinic Visit     BRCA2 genetic cancer      Initial Vitals: /69   Pulse 69   Temp 97.8  F (36.6  C)   Resp 16   Wt 88.5 kg (195 lb)   SpO2 100%   BMI 24.74 kg/m   Estimated body mass index is 24.74 kg/m  as calculated from the following:    Height as of 6/30/23: 1.891 m (6' 2.45\").    Weight as of this encounter: 88.5 kg (195 lb). Body surface area is 2.16 meters squared.  No Pain (0) Comment: Data Unavailable   No LMP for male patient.  Allergies reviewed: Yes  Medications reviewed: Yes    Medications: Medication refills not needed today.  Pharmacy name entered into [facility]: [id number] - [place], [place] - [address]    Frailty Screening:   Is the patient here for a new oncology consult visit in cancer care? 2. No      Clinical concerns: no other complaints      Shamir Boyce"

## 2024-06-12 NOTE — NURSING NOTE
Chief Complaint   Patient presents with    Lab Only     Labs drawn with  by rn.     Labs drawn with  by rn.  Pt tolerated well.     Marilu Conklin RN

## 2024-06-23 ENCOUNTER — HEALTH MAINTENANCE LETTER (OUTPATIENT)
Age: 39
End: 2024-06-23

## 2025-06-06 NOTE — PROGRESS NOTES
Oncology Risk Management Consultation:  Date on this visit: 2025    Max Rose returns to the clinic today for an oncology risk management consultation. He requires specialized screening to reduce his risk of cancer secondary to a deleterious BRCA2 mutation and is considered to be at high risk for hereditary male breast and prostate cancer.      Primary Physician: Delio Wiseman MD     History Of Present Illness:  Mr. Rose is a very pleasant, healthy 40 year old male who presents with family history of breast and prostate cancer and a personal diagnosis of a BRCA2 mutation.     Genetic Testin2014- Positive for the 6174delT BRCA2 gene, identified using a Multisite 3 BRACAnalysis through [employer] at [facility].       Screening History:     Pancreas:  6/10/2015- CT of abdomen and pelvis (Dr. Mark Diane): IMPRESSION:   1. No evidence of pancreatic cancer.  2. 7 cm simple cyst in the upper pole of the left kidney.  3. Calcifications in the prostate.      Prostate:  2016- 0.6, WNL  2023: 1.49  2024: 0.59    Reports annual dermatology screenings and occasional optometry screenings.    Past Medical/Surgical History:  Past Medical History:   Diagnosis Date    BRCA2 positive 2014    6174delTmutation     No past surgical history on file.    Allergies:  Allergies as of 2025 - Reviewed 2025   Allergen Reaction Noted    Trazodone Other (See Comments) 2023       Current Medications:  Current Outpatient Medications   Medication Sig Dispense Refill    betamethasone valerate (VALISONE) 0.1 % external cream       dextroamphetamine (DEXEDRINE SPANSULE) 10 MG 24 hr capsule Take 20 mg by mouth      hydrocortisone 2.5 % cream       QUEtiapine (SEROQUEL) 50 MG tablet Take  mg by mouth      tacrolimus (PROTOPIC) 0.1 % external ointment       fluticasone (FLONASE) 50 MCG/ACT nasal spray 2 sprays (Patient not taking: Reported on 2025)          Family  History:  Family History   Problem Relation Age of Onset    Hypothyroidism Mother     Hypertension Mother     Hereditary Breast and Ovarian Cancer Syndrome Father         obligate carrier    Lymphoma Father          at 69    Parkinsonism Father     Hereditary Breast and Ovarian Cancer Syndrome Sister     Restless Leg Syndrome Brother     Sleep Apnea Brother     Esophageal Cancer Maternal Grandmother 78        hx of smoking    Bladder Cancer Maternal Grandfather         hx of smoking;  at 88    Myocardial Infarction Maternal Grandfather     Breast Cancer Paternal Grandmother     Pancreatic Cancer Paternal Grandmother          at 64    Emphysema Paternal Grandfather     Myocardial Infarction Paternal Grandfather     Breast Cancer Paternal Aunt 50        bilateral breast cancer    Hereditary Breast and Ovarian Cancer Syndrome Paternal Aunt         BRCA2+    Colon Cancer Maternal Great-Grandmother         over the age of 50    Colon Cancer Other 40        paternal great uncle;  in 40       Social History:  Social History     Socioeconomic History    Marital status: Single     Spouse name: Not on file    Number of children: Not on file    Years of education: Not on file    Highest education level: Not on file   Occupational History    Occupation: Physician     Employer: UNKNOWN   Tobacco Use    Smoking status: Never    Smokeless tobacco: Never   Substance and Sexual Activity    Alcohol use: Not Currently    Drug use: Not on file    Sexual activity: Not on file   Other Topics Concern    Parent/sibling w/ CABG, MI or angioplasty before 65F 55M? Not Asked   Social History Narrative    Not on file     Social Drivers of Health     Financial Resource Strain: Not on file   Food Insecurity: No Food Insecurity (2025)    [facility]    Hunger Vital Sign     Worried About Running Out of Food in the Last Year: Never true     Ran Out of Food in the Last Year: Never true   Transportation Needs: No  Transportation Needs (5/5/2025)    Received from [facility]    PRAPARE - Transportation     Lack of Transportation (Medical): No     Lack of Transportation (Non-Medical): No   Physical Activity: Not on file   Stress: Not on file   Social Connections: Not on file   Interpersonal Safety: Not on file   Housing Stability: Low Risk  (5/5/2025)    Received from [employer]    Housing Stability Vital Sign     Unable to Pay for Housing in the Last Year: No     Number of Times Moved in the Last Year: 0     Homeless in the Last Year: No       Physical Exam:  /72   Pulse 75   Temp 97.7  F (36.5  C) (Oral)   Resp 16   Wt 88.9 kg (196 lb)   SpO2 98%   BMI 24.86 kg/m    GENERAL APPEARANCE: healthy, alert and no apparent distress  CHEST WALL: Examined in a sitting and lying position. Symmetrical without visible distortion, swelling, lumps or rashes. No palpable lumps or masses. Axillary area without masses or lymphadenopathy.   LYMPHATICS: No cervical, supraclavicular, or axillary lymphadenopathy  SKIN: no suspicious lesions or rashes on examined skin    ASSESSMENT    Max reports that he is doing well at this visit. He has no updates to his family's medical history. He has been having urologic symptoms and is seeing urology in August. We discussed the screening plan below. He is having a PSA drawn after this visit. We will continue these annually. We will begin pancreatic cancer screening at age 50, and consider an annual mammogram at that time as well.     His chest wall exam was unremarkable. We reviewed signs and symptoms to monitor for between visits, including any breast lumps, bumps, nipple discharge, changes to the texture of the skin on the breast. We reviewed the recommendation for breast self awareness. We will proceed with the plan below.       INDIVIDUALIZED CANCER RISK MANAGEMENT PLAN:      Individualized Surveillance Plan  BRCA1 and BRCA2 Management for Men   Per NCCN Guidelines Version 2.2025    Test or procedure Recommendation Last done Next Scheduled    Male breast cancer, starting at age 35    Risk is 0.2%-1.2% for BRCA1+  Risk is 1.8%-7.1% for BRCA2+ Breast self exam training and education    Annual clinical breast exam.    Consider annual mammogram, especially for those with BRCA2 P/LP variants in whom the lifetime risk of breast cancer is up to 7%, starting at age 50 or 10 years before the earliest known male breast cancer in the family.   No male breast cancer in the family    Annual chest wall exam, due in June, 2026    Consider annual mammogram at age 50   Prostate Cancer:    Risk is 19-61% for BRCA2+  Risk is 7-26% for BRCA1+ Annual PSA test    Prostate cancer screening recommended for BRCA2 carriers starting at age 40.    Consider prostate cancer screening for BRCA1 carriers, starting at age 40.   6/12/2024: PSA, 0.59   PSA today    Repeat in one year   Pancreatic Cancer:    BRCA1+: <5%  BRCA2+: 5-10%    Alternate Magnetic Resonance Cholangiopancreatography and Endoscopic Ultrasound.    BRCA1+: Pancreatic screening starting at age 50 or 10 years prior to the youngest age of pancreatic cancer in the family, for patients with at least one relative from the BRCA-side of the family with pancreatic cancer.    BRCA2+: Pancreatic screening starting at age 50 or 10 years prior to the youngest age of pancreatic cancer in the family.   Paternal grandmother with pancreatic cancer    Begin pancreatic cancer screening at age 50       Consider full body skin and eye exam for melanoma screening.       I spent a total of 30 minutes on the day of the visit. Please see the note for further information on patient assessment and treatment.     Tracy Garnica, DNP, APRN, AGCNS-BC  Clinical Nurse Specialist  Cancer Risk Management Program  [facility]    Cc:  Delio Wiseman MD

## 2025-06-11 ENCOUNTER — LAB (OUTPATIENT)
Dept: LAB | Facility: CLINIC | Age: 40
End: 2025-06-11
Payer: COMMERCIAL

## 2025-06-11 ENCOUNTER — ONCOLOGY VISIT (OUTPATIENT)
Dept: ONCOLOGY | Facility: CLINIC | Age: 40
End: 2025-06-11
Payer: COMMERCIAL

## 2025-06-11 VITALS
BODY MASS INDEX: 24.86 KG/M2 | HEART RATE: 75 BPM | RESPIRATION RATE: 16 BRPM | OXYGEN SATURATION: 98 % | TEMPERATURE: 97.7 F | SYSTOLIC BLOOD PRESSURE: 118 MMHG | DIASTOLIC BLOOD PRESSURE: 72 MMHG | WEIGHT: 196 LBS

## 2025-06-11 DIAGNOSIS — Z15.09 BRCA2 GENETIC CARRIER: Primary | ICD-10-CM

## 2025-06-11 DIAGNOSIS — Z15.09 BRCA2 GENETIC CARRIER: ICD-10-CM

## 2025-06-11 DIAGNOSIS — Z12.5 SCREENING FOR PROSTATE CANCER: ICD-10-CM

## 2025-06-11 DIAGNOSIS — Z15.01 BRCA2 GENETIC CARRIER: Primary | ICD-10-CM

## 2025-06-11 DIAGNOSIS — Z15.01 BRCA2 GENETIC CARRIER: ICD-10-CM

## 2025-06-11 LAB — PSA SERPL DL<=0.01 NG/ML-MCNC: 0.52 NG/ML (ref 0–2.5)

## 2025-06-11 PROCEDURE — 36415 COLL VENOUS BLD VENIPUNCTURE: CPT

## 2025-06-11 PROCEDURE — 99213 OFFICE O/P EST LOW 20 MIN: CPT

## 2025-06-11 PROCEDURE — 84153 ASSAY OF PSA TOTAL: CPT

## 2025-06-11 PROCEDURE — 99214 OFFICE O/P EST MOD 30 MIN: CPT

## 2025-06-11 ASSESSMENT — PAIN SCALES - GENERAL: PAINLEVEL_OUTOF10: NO PAIN (0)

## 2025-06-11 NOTE — LETTER
2025      Max Rose  [address]  [place] [address]      Dear Colleague,    Thank you for referring your patient, Max Rose, to the [facility]. Please see a copy of my visit note below.    Oncology Risk Management Consultation:  Date on this visit: 2025    Max Rose returns to the clinic today for an oncology risk management consultation. He requires specialized screening to reduce his risk of cancer secondary to a deleterious BRCA2 mutation and is considered to be at high risk for hereditary male breast and prostate cancer.      Primary Physician: Delio Wiseman MD     History Of Present Illness:  Mr. Rose is a very pleasant, healthy 40 year old male who presents with family history of breast and prostate cancer and a personal diagnosis of a BRCA2 mutation.     Genetic Testin2014- Positive for the 6174delT BRCA2 gene, identified using a Multisite 3 BRACAnalysis through [employer] at [facility].       Screening History:     Pancreas:  6/10/2015- CT of abdomen and pelvis (Dr. Mark Diane): IMPRESSION:   1. No evidence of pancreatic cancer.  2. 7 cm simple cyst in the upper pole of the left kidney.  3. Calcifications in the prostate.      Prostate:  2016- 0.6, WNL  2023: 1.49  2024: 0.59    Reports annual dermatology screenings and occasional optometry screenings.    Past Medical/Surgical History:  Past Medical History:   Diagnosis Date     BRCA2 positive 2014    6174delTmutation     No past surgical history on file.    Allergies:  Allergies as of 2025 - Reviewed 2025   Allergen Reaction Noted     Trazodone Other (See Comments) 2023       Current Medications:  Current Outpatient Medications   Medication Sig Dispense Refill     betamethasone valerate (VALISONE) 0.1 % external cream        dextroamphetamine (DEXEDRINE SPANSULE) 10 MG 24 hr capsule Take 20 mg by mouth       hydrocortisone 2.5  % cream        QUEtiapine (SEROQUEL) 50 MG tablet Take  mg by mouth       tacrolimus (PROTOPIC) 0.1 % external ointment        fluticasone (FLONASE) 50 MCG/ACT nasal spray 2 sprays (Patient not taking: Reported on 2025)          Family History:  Family History   Problem Relation Age of Onset     Hypothyroidism Mother      Hypertension Mother      Hereditary Breast and Ovarian Cancer Syndrome Father         obligate carrier     Lymphoma Father          at 69     Parkinsonism Father      Hereditary Breast and Ovarian Cancer Syndrome Sister      Restless Leg Syndrome Brother      Sleep Apnea Brother      Esophageal Cancer Maternal Grandmother 78        hx of smoking     Bladder Cancer Maternal Grandfather         hx of smoking;  at 88     Myocardial Infarction Maternal Grandfather      Breast Cancer Paternal Grandmother      Pancreatic Cancer Paternal Grandmother          at 64     Emphysema Paternal Grandfather      Myocardial Infarction Paternal Grandfather      Breast Cancer Paternal Aunt 50        bilateral breast cancer     Hereditary Breast and Ovarian Cancer Syndrome Paternal Aunt         BRCA2+     Colon Cancer Maternal Great-Grandmother         over the age of 50     Colon Cancer Other 40        paternal great uncle;  in 40       Social History:  Social History     Socioeconomic History     Marital status: Single     Spouse name: Not on file     Number of children: Not on file     Years of education: Not on file     Highest education level: Not on file   Occupational History     Occupation: Physician     Employer: UNKNOWN   Tobacco Use     Smoking status: Never     Smokeless tobacco: Never   Substance and Sexual Activity     Alcohol use: Not Currently     Drug use: Not on file     Sexual activity: Not on file   Other Topics Concern     Parent/sibling w/ CABG, MI or angioplasty before 65F 55M? Not Asked   Social History Narrative     Not on file     Social Drivers of Health      Financial Resource Strain: Not on file   Food Insecurity: No Food Insecurity (5/5/2025)    Received from [employer]    Hunger Vital Sign      Worried About Running Out of Food in the Last Year: Never true      Ran Out of Food in the Last Year: Never true   Transportation Needs: No Transportation Needs (5/5/2025)    Received from [employer]    PRAPARE - Transportation      Lack of Transportation (Medical): No      Lack of Transportation (Non-Medical): No   Physical Activity: Not on file   Stress: Not on file   Social Connections: Not on file   Interpersonal Safety: Not on file   Housing Stability: Low Risk  (5/5/2025)    Received from [employer]    Housing Stability Vital Sign      Unable to Pay for Housing in the Last Year: No      Number of Times Moved in the Last Year: 0      Homeless in the Last Year: No       Physical Exam:  /72   Pulse 75   Temp 97.7  F (36.5  C) (Oral)   Resp 16   Wt 88.9 kg (196 lb)   SpO2 98%   BMI 24.86 kg/m    GENERAL APPEARANCE: healthy, alert and no apparent distress  CHEST WALL: Examined in a sitting and lying position. Symmetrical without visible distortion, swelling, lumps or rashes. No palpable lumps or masses. Axillary area without masses or lymphadenopathy.   LYMPHATICS: No cervical, supraclavicular, or axillary lymphadenopathy  SKIN: no suspicious lesions or rashes on examined skin    ASSESSMENT    Max reports that he is doing well at this visit. He has no updates to his family's medical history. He has been having urologic symptoms and is seeing urology in August. We discussed the screening plan below. He is having a PSA drawn after this visit. We will continue these annually. We will begin pancreatic cancer screening at age 50, and consider an annual mammogram at that time as well.     His chest wall exam was unremarkable. We reviewed signs and symptoms to monitor for between visits, including any breast lumps, bumps, nipple discharge, changes to  the texture of the skin on the breast. We reviewed the recommendation for breast self awareness. We will proceed with the plan below.       INDIVIDUALIZED CANCER RISK MANAGEMENT PLAN:      Individualized Surveillance Plan  BRCA1 and BRCA2 Management for Men   Per NCCN Guidelines Version 2.2025   Test or procedure Recommendation Last done Next Scheduled    Male breast cancer, starting at age 35    Risk is 0.2%-1.2% for BRCA1+  Risk is 1.8%-7.1% for BRCA2+ Breast self exam training and education    Annual clinical breast exam.    Consider annual mammogram, especially for those with BRCA2 P/LP variants in whom the lifetime risk of breast cancer is up to 7%, starting at age 50 or 10 years before the earliest known male breast cancer in the family.   No male breast cancer in the family    Annual chest wall exam, due in June, 2026    Consider annual mammogram at age 50   Prostate Cancer:    Risk is 19-61% for BRCA2+  Risk is 7-26% for BRCA1+ Annual PSA test    Prostate cancer screening recommended for BRCA2 carriers starting at age 40.    Consider prostate cancer screening for BRCA1 carriers, starting at age 40.   6/12/2024: PSA, 0.59   PSA today    Repeat in one year   Pancreatic Cancer:    BRCA1+: <5%  BRCA2+: 5-10%    Alternate Magnetic Resonance Cholangiopancreatography and Endoscopic Ultrasound.    BRCA1+: Pancreatic screening starting at age 50 or 10 years prior to the youngest age of pancreatic cancer in the family, for patients with at least one relative from the BRCA-side of the family with pancreatic cancer.    BRCA2+: Pancreatic screening starting at age 50 or 10 years prior to the youngest age of pancreatic cancer in the family.   Paternal grandmother with pancreatic cancer    Begin pancreatic cancer screening at age 50       Consider full body skin and eye exam for melanoma screening.       I spent a total of 30 minutes on the day of the visit. Please see the note for further information on patient assessment  and treatment.     Tracy Garnica DNP, ISABELL, [facility]-BC  Clinical Nurse Specialist  Cancer Risk Management Program  [facility]    Cc:  Delio Wiseman MD                Again, thank you for allowing me to participate in the care of your patient.        Sincerely,        ISABELL Castillo CNP    Electronically signed

## 2025-06-11 NOTE — PATIENT INSTRUCTIONS
Individualized Surveillance Plan  BRCA1 and BRCA2 Management for Men   Per NCCN Guidelines Version 2.2025   Test or procedure Recommendation Last done Next Scheduled    Male breast cancer, starting at age 35    Risk is 0.2%-1.2% for BRCA1+  Risk is 1.8%-7.1% for BRCA2+ Breast self exam training and education    Annual clinical breast exam.    Consider annual mammogram, especially for those with BRCA2 P/LP variants in whom the lifetime risk of breast cancer is up to 7%, starting at age 50 or 10 years before the earliest known male breast cancer in the family.   No male breast cancer in the family    Annual chest wall exam, due in June, 2026    Consider annual mammogram at age 50   Prostate Cancer:    Risk is 19-61% for BRCA2+  Risk is 7-26% for BRCA1+ Annual PSA test    Prostate cancer screening recommended for BRCA2 carriers starting at age 40.    Consider prostate cancer screening for BRCA1 carriers, starting at age 40.   6/12/2024: PSA, 0.59   PSA today    Repeat in one year   Pancreatic Cancer:    BRCA1+: <5%  BRCA2+: 5-10%    Alternate Magnetic Resonance Cholangiopancreatography and Endoscopic Ultrasound.    BRCA1+: Pancreatic screening starting at age 50 or 10 years prior to the youngest age of pancreatic cancer in the family, for patients with at least one relative from the BRCA-side of the family with pancreatic cancer.    BRCA2+: Pancreatic screening starting at age 50 or 10 years prior to the youngest age of pancreatic cancer in the family.   Paternal grandmother with pancreatic cancer    Begin pancreatic cancer screening at age 50       Consider full body skin and eye exam for melanoma screening.

## 2025-06-11 NOTE — NURSING NOTE
Chief Complaint   Patient presents with    Labs Only    Blood Draw     Labs drawn via VPT by lab RN     Venipuncture labs drawn by lab RN,     Jenny Tenorio RN    
Altered Mental Status

## 2025-06-12 ENCOUNTER — RESULTS FOLLOW-UP (OUTPATIENT)
Dept: ONCOLOGY | Facility: CLINIC | Age: 40
End: 2025-06-12

## 2025-07-12 ENCOUNTER — HEALTH MAINTENANCE LETTER (OUTPATIENT)
Age: 40
End: 2025-07-12